# Patient Record
Sex: MALE | Race: WHITE | NOT HISPANIC OR LATINO | Employment: FULL TIME | URBAN - METROPOLITAN AREA
[De-identification: names, ages, dates, MRNs, and addresses within clinical notes are randomized per-mention and may not be internally consistent; named-entity substitution may affect disease eponyms.]

---

## 2023-01-23 PROBLEM — S86.319A TEAR OF PERONEAL TENDON: Status: ACTIVE | Noted: 2023-01-23

## 2023-01-23 PROBLEM — R79.89 LOW TESTOSTERONE: Status: ACTIVE | Noted: 2023-01-23

## 2023-01-23 PROBLEM — S46.911A STRAIN OF RIGHT SHOULDER: Status: ACTIVE | Noted: 2023-01-23

## 2023-01-23 PROBLEM — J45.901 ASTHMA EXACERBATION, MILD (HHS-HCC): Status: ACTIVE | Noted: 2023-01-23

## 2023-01-23 PROBLEM — E29.1 HYPOGONADISM MALE: Status: ACTIVE | Noted: 2023-01-23

## 2023-01-23 PROBLEM — S89.90XA KNEE INJURY: Status: ACTIVE | Noted: 2023-01-23

## 2023-01-23 PROBLEM — R26.9 GAIT ABNORMALITY: Status: ACTIVE | Noted: 2023-01-23

## 2023-01-23 PROBLEM — M25.671 DECREASED RANGE OF MOTION OF RIGHT ANKLE: Status: ACTIVE | Noted: 2023-01-23

## 2023-01-23 PROBLEM — M25.562 KNEE PAIN, BILATERAL: Status: ACTIVE | Noted: 2023-01-23

## 2023-01-23 PROBLEM — L98.9 SKIN LESION: Status: ACTIVE | Noted: 2023-01-23

## 2023-01-23 PROBLEM — M25.561 KNEE PAIN, BILATERAL: Status: ACTIVE | Noted: 2023-01-23

## 2023-01-23 PROBLEM — E66.9 OBESITY: Status: ACTIVE | Noted: 2023-01-23

## 2023-01-23 PROBLEM — Q66.70 PES CAVUS: Status: ACTIVE | Noted: 2023-01-23

## 2023-01-23 PROBLEM — R10.9 ABDOMINAL PAIN: Status: ACTIVE | Noted: 2023-01-23

## 2023-01-23 PROBLEM — I82.409 DVT (DEEP VENOUS THROMBOSIS) (MULTI): Status: ACTIVE | Noted: 2023-01-23

## 2023-01-23 PROBLEM — R30.0 DYSURIA: Status: ACTIVE | Noted: 2023-01-23

## 2023-01-23 PROBLEM — M76.71 PERONEAL TENDINITIS OF RIGHT LOWER EXTREMITY: Status: ACTIVE | Noted: 2023-01-23

## 2023-01-23 PROBLEM — S49.90XA SHOULDER INJURY: Status: ACTIVE | Noted: 2023-01-23

## 2023-01-23 PROBLEM — R68.82 LOW LIBIDO: Status: ACTIVE | Noted: 2023-01-23

## 2023-01-23 PROBLEM — R29.898 ANKLE WEAKNESS: Status: ACTIVE | Noted: 2023-01-23

## 2023-01-23 PROBLEM — G90.521 COMPLEX REGIONAL PAIN SYNDROME TYPE 1 OF RIGHT LOWER EXTREMITY: Status: ACTIVE | Noted: 2023-01-23

## 2023-01-23 PROBLEM — H66.004 RECURRENT ACUTE SUPPURATIVE OTITIS MEDIA OF RIGHT EAR WITHOUT SPONTANEOUS RUPTURE OF TYMPANIC MEMBRANE: Status: ACTIVE | Noted: 2023-01-23

## 2023-01-23 PROBLEM — R53.83 LETHARGY: Status: ACTIVE | Noted: 2023-01-23

## 2023-01-23 PROBLEM — L01.02 PUSTULAR FOLLICULITIS: Status: ACTIVE | Noted: 2023-01-23

## 2023-01-23 PROBLEM — M67.88 ACHILLES TENDINOSIS OF RIGHT LOWER EXTREMITY: Status: ACTIVE | Noted: 2023-01-23

## 2023-01-23 RX ORDER — DEXTROAMPHETAMINE SACCHARATE, AMPHETAMINE ASPARTATE, DEXTROAMPHETAMINE SULFATE AND AMPHETAMINE SULFATE 2.5; 2.5; 2.5; 2.5 MG/1; MG/1; MG/1; MG/1
TABLET ORAL
COMMUNITY

## 2023-01-23 RX ORDER — MELOXICAM 15 MG/1
15 TABLET ORAL DAILY
COMMUNITY
End: 2023-03-08 | Stop reason: SDUPTHER

## 2023-02-09 PROBLEM — E88.810 METABOLIC SYNDROME X: Status: ACTIVE | Noted: 2022-12-14

## 2023-02-09 PROBLEM — G47.00 INSOMNIA: Status: ACTIVE | Noted: 2021-06-24

## 2023-02-09 PROBLEM — F33.0 MILD RECURRENT MAJOR DEPRESSION (CMS-HCC): Status: ACTIVE | Noted: 2021-06-24

## 2023-02-09 PROBLEM — F90.0 ATTENTION DEFICIT HYPERACTIVITY DISORDER, PREDOMINANTLY INATTENTIVE TYPE: Status: ACTIVE | Noted: 2021-10-20

## 2023-02-09 PROBLEM — M25.569 PAIN IN UNSPECIFIED KNEE: Status: ACTIVE | Noted: 2021-10-20

## 2023-02-09 PROBLEM — E29.1 TESTICULAR HYPOFUNCTION: Status: ACTIVE | Noted: 2021-08-26

## 2023-02-09 RX ORDER — LEVOCETIRIZINE DIHYDROCHLORIDE 5 MG/1
1 TABLET, FILM COATED ORAL DAILY
COMMUNITY
Start: 2021-06-24

## 2023-02-09 RX ORDER — AZELASTINE 1 MG/ML
1 SPRAY, METERED NASAL 2 TIMES DAILY
COMMUNITY
Start: 2022-06-21

## 2023-02-09 RX ORDER — DEXTROAMPHETAMINE SACCHARATE, AMPHETAMINE ASPARTATE, DEXTROAMPHETAMINE SULFATE AND AMPHETAMINE SULFATE 5; 5; 5; 5 MG/1; MG/1; MG/1; MG/1
1 TABLET ORAL 2 TIMES DAILY
COMMUNITY
Start: 2022-01-04

## 2023-02-09 RX ORDER — DESLORATADINE 5 MG/1
1 TABLET ORAL DAILY
COMMUNITY

## 2023-02-09 RX ORDER — TESTOSTERONE CYPIONATE 1000 MG/10ML
2 INJECTION, SOLUTION INTRAMUSCULAR
COMMUNITY
Start: 2021-08-27

## 2023-02-09 RX ORDER — ONDANSETRON 4 MG/1
1 TABLET, ORALLY DISINTEGRATING ORAL EVERY 6 HOURS
COMMUNITY
Start: 2022-12-20

## 2023-03-08 ENCOUNTER — OFFICE VISIT (OUTPATIENT)
Dept: PRIMARY CARE | Facility: CLINIC | Age: 43
End: 2023-03-08
Payer: COMMERCIAL

## 2023-03-08 VITALS
SYSTOLIC BLOOD PRESSURE: 124 MMHG | BODY MASS INDEX: 39.96 KG/M2 | HEART RATE: 84 BPM | TEMPERATURE: 98.4 F | OXYGEN SATURATION: 97 % | WEIGHT: 295 LBS | DIASTOLIC BLOOD PRESSURE: 82 MMHG | HEIGHT: 72 IN

## 2023-03-08 DIAGNOSIS — G89.4 CHRONIC PAIN SYNDROME: ICD-10-CM

## 2023-03-08 DIAGNOSIS — R05.1 ACUTE COUGH: ICD-10-CM

## 2023-03-08 DIAGNOSIS — F33.0 MILD RECURRENT MAJOR DEPRESSION (CMS-HCC): ICD-10-CM

## 2023-03-08 DIAGNOSIS — H66.001 ACUTE SUPPURATIVE OTITIS MEDIA OF RIGHT EAR WITHOUT SPONTANEOUS RUPTURE OF TYMPANIC MEMBRANE, RECURRENCE NOT SPECIFIED: ICD-10-CM

## 2023-03-08 DIAGNOSIS — E66.01 CLASS 3 SEVERE OBESITY DUE TO EXCESS CALORIES WITHOUT SERIOUS COMORBIDITY WITH BODY MASS INDEX (BMI) OF 40.0 TO 44.9 IN ADULT (MULTI): ICD-10-CM

## 2023-03-08 DIAGNOSIS — E29.1 TESTICULAR HYPOFUNCTION: Primary | ICD-10-CM

## 2023-03-08 PROBLEM — M25.569 PAIN IN UNSPECIFIED KNEE: Status: RESOLVED | Noted: 2021-10-20 | Resolved: 2023-03-08

## 2023-03-08 PROBLEM — S46.911A STRAIN OF RIGHT SHOULDER: Status: RESOLVED | Noted: 2023-01-23 | Resolved: 2023-03-08

## 2023-03-08 PROBLEM — R79.89 LOW TESTOSTERONE: Status: RESOLVED | Noted: 2023-01-23 | Resolved: 2023-03-08

## 2023-03-08 PROBLEM — R26.9 GAIT ABNORMALITY: Status: RESOLVED | Noted: 2023-01-23 | Resolved: 2023-03-08

## 2023-03-08 PROBLEM — M25.671 DECREASED RANGE OF MOTION OF RIGHT ANKLE: Status: RESOLVED | Noted: 2023-01-23 | Resolved: 2023-03-08

## 2023-03-08 PROBLEM — J45.901 ASTHMA EXACERBATION, MILD (HHS-HCC): Status: RESOLVED | Noted: 2023-01-23 | Resolved: 2023-03-08

## 2023-03-08 PROBLEM — R30.0 DYSURIA: Status: RESOLVED | Noted: 2023-01-23 | Resolved: 2023-03-08

## 2023-03-08 PROBLEM — R10.9 ABDOMINAL PAIN: Status: RESOLVED | Noted: 2023-01-23 | Resolved: 2023-03-08

## 2023-03-08 PROBLEM — M76.71 PERONEAL TENDINITIS OF RIGHT LOWER EXTREMITY: Status: RESOLVED | Noted: 2023-01-23 | Resolved: 2023-03-08

## 2023-03-08 PROBLEM — I82.409 DVT (DEEP VENOUS THROMBOSIS) (MULTI): Status: RESOLVED | Noted: 2023-01-23 | Resolved: 2023-03-08

## 2023-03-08 PROBLEM — R29.898 ANKLE WEAKNESS: Status: RESOLVED | Noted: 2023-01-23 | Resolved: 2023-03-08

## 2023-03-08 PROBLEM — L98.9 SKIN LESION: Status: RESOLVED | Noted: 2023-01-23 | Resolved: 2023-03-08

## 2023-03-08 PROBLEM — S86.319A TEAR OF PERONEAL TENDON: Status: RESOLVED | Noted: 2023-01-23 | Resolved: 2023-03-08

## 2023-03-08 PROBLEM — R68.82 LOW LIBIDO: Status: RESOLVED | Noted: 2023-01-23 | Resolved: 2023-03-08

## 2023-03-08 PROBLEM — L01.02 PUSTULAR FOLLICULITIS: Status: RESOLVED | Noted: 2023-01-23 | Resolved: 2023-03-08

## 2023-03-08 PROBLEM — Z91.030 BEE ALLERGY STATUS: Status: ACTIVE | Noted: 2023-03-08

## 2023-03-08 PROBLEM — S89.90XA KNEE INJURY: Status: RESOLVED | Noted: 2023-01-23 | Resolved: 2023-03-08

## 2023-03-08 PROBLEM — S49.90XA SHOULDER INJURY: Status: RESOLVED | Noted: 2023-01-23 | Resolved: 2023-03-08

## 2023-03-08 PROCEDURE — 1036F TOBACCO NON-USER: CPT | Performed by: FAMILY MEDICINE

## 2023-03-08 PROCEDURE — 99214 OFFICE O/P EST MOD 30 MIN: CPT | Performed by: FAMILY MEDICINE

## 2023-03-08 PROCEDURE — 3008F BODY MASS INDEX DOCD: CPT | Performed by: FAMILY MEDICINE

## 2023-03-08 RX ORDER — NEOMYCIN SULFATE, POLYMYXIN B SULFATE, AND DEXAMETHASONE 3.5; 10000; 1 MG/G; [USP'U]/G; MG/G
OINTMENT OPHTHALMIC
COMMUNITY
Start: 2022-10-24

## 2023-03-08 RX ORDER — CIPROFLOXACIN AND DEXAMETHASONE 3; 1 MG/ML; MG/ML
SUSPENSION/ DROPS AURICULAR (OTIC)
COMMUNITY
Start: 2023-03-06

## 2023-03-08 RX ORDER — DIAPER,BRIEF,ADULT, DISPOSABLE
EACH MISCELLANEOUS
COMMUNITY

## 2023-03-08 RX ORDER — NEOMYCIN SULFATE, POLYMYXIN B SULFATE AND DEXAMETHASONE 3.5; 10000; 1 MG/ML; [USP'U]/ML; MG/ML
SUSPENSION/ DROPS OPHTHALMIC
COMMUNITY
Start: 2022-06-09

## 2023-03-08 RX ORDER — DULOXETIN HYDROCHLORIDE 20 MG/1
CAPSULE, DELAYED RELEASE ORAL
COMMUNITY
Start: 2023-03-06 | End: 2023-03-08 | Stop reason: ALTCHOICE

## 2023-03-08 RX ORDER — BENZONATATE 100 MG/1
100 CAPSULE ORAL 3 TIMES DAILY PRN
Qty: 42 CAPSULE | Refills: 0 | Status: SHIPPED | OUTPATIENT
Start: 2023-03-08 | End: 2023-04-07

## 2023-03-08 RX ORDER — DULOXETIN HYDROCHLORIDE 30 MG/1
30 CAPSULE, DELAYED RELEASE ORAL 2 TIMES DAILY
Qty: 60 CAPSULE | Refills: 5 | Status: SHIPPED | OUTPATIENT
Start: 2023-03-08 | End: 2023-09-04

## 2023-03-08 RX ORDER — AMOXICILLIN AND CLAVULANATE POTASSIUM 875; 125 MG/1; MG/1
875 TABLET, FILM COATED ORAL 2 TIMES DAILY
Qty: 20 TABLET | Refills: 0 | Status: SHIPPED | OUTPATIENT
Start: 2023-03-08 | End: 2023-03-18

## 2023-03-08 RX ORDER — MELOXICAM 15 MG/1
15 TABLET ORAL DAILY
Qty: 90 TABLET | Refills: 1 | Status: SHIPPED | OUTPATIENT
Start: 2023-03-08 | End: 2023-06-06

## 2023-03-08 RX ORDER — ALBUTEROL SULFATE 90 UG/1
AEROSOL, METERED RESPIRATORY (INHALATION)
COMMUNITY

## 2023-03-08 RX ORDER — CYCLOBENZAPRINE HCL 5 MG
5 TABLET ORAL NIGHTLY PRN
COMMUNITY
Start: 2023-02-06

## 2023-03-08 RX ORDER — EPINEPHRINE 0.3 MG/.3ML
INJECTION SUBCUTANEOUS
COMMUNITY
Start: 2023-02-10

## 2023-03-08 RX ORDER — TRIAMCINOLONE ACETONIDE 5 MG/G
CREAM TOPICAL
COMMUNITY
Start: 2023-02-10

## 2023-03-08 ASSESSMENT — ENCOUNTER SYMPTOMS
COUGH: 1
FEVER: 0
CHILLS: 0

## 2023-03-08 ASSESSMENT — PATIENT HEALTH QUESTIONNAIRE - PHQ9
1. LITTLE INTEREST OR PLEASURE IN DOING THINGS: NOT AT ALL
2. FEELING DOWN, DEPRESSED OR HOPELESS: NOT AT ALL
SUM OF ALL RESPONSES TO PHQ9 QUESTIONS 1 AND 2: 0

## 2023-03-08 NOTE — PROGRESS NOTES
ASSESSMENT/PLAN:      Problem List Items Addressed This Visit          Nervous    Chronic pain syndrome    Relevant Medications    DULoxetine (Cymbalta) 30 mg DR capsule    meloxicam (Mobic) 15 mg tablet       Endocrine/Metabolic    Obesity    Relevant Medications    orlistat (Chris) 60 mg capsule    Testicular hypofunction - Primary       Other    Mild recurrent major depression (CMS/HCC)     Other Visit Diagnoses       Acute cough        Relevant Medications    benzonatate (Tessalon) 100 mg capsule    Acute suppurative otitis media of right ear without spontaneous rupture of tympanic membrane, recurrence not specified        Relevant Medications    amoxicillin-pot clavulanate (Augmentin) 875-125 mg tablet    Other Relevant Orders    Referral to ENT            Patient Instructions:  Patient Instructions   For your pain, we increased your duloxetine to 30 mg.  Glad that this is helping you significantly  Obesity: Continue exercising and healthy eating, will try orlistat, hopefully insurance covers it.  Recommend reaching out to your insurance company to figure out what medications they cover for weight loss  Recurrent ear infection: We sent in a prescription for Augmentin, and refer to ENT  Follow-up in 3 months for weight check        Signed by: Meño Diaz DO       FUTURE DIRECTION:   Encourage patient to call insurance company to figure out what meds are covered for obesity  May need to increase duloxetine to 60 mg daily  May need to discontinue orlistat if patient experiencing significant nausea or diarrhea    Subjective   SUBJECTIVE:     HPI : Patient is a 42 y.o. male who presents today for the following     ADHD  No longer taking Adderall due to side effects    Chronic pain  Continue meloxicam 15 mg, follows rheumatology, doing well with duloxetine    Obesity  states that he has been working on weight loss   Has bee seeing a nutritionist, and exercise twice daily (about 300min of cardio weekly)    Currently on 2000 calories daily     Date             Weight (lbs)             Intervention   8/29/22          300lbs                          none  10/28/22        291lbs                     nutritionist and exercising   12/14/22        290lbs                     nutritionist and exercising   2/10/22          295lbs                     nutritionist and exercising  3/8/2022        295 LBS                   nutritionist and exercising           Right ear pain   Patient states that for the past few years has been having recurrent right ear infection, over the past week has noticed increased in bloody drainage.  He normally does not have any pain when he is having an ear infection.  This is also associated with a cough that has been persistent throughout the day, it is nonproductive.  He denies fevers or chills      Review of Systems   Constitutional:  Negative for chills and fever.   HENT:  Positive for ear discharge.    Respiratory:  Positive for cough.        Past Medical History:   Diagnosis Date    DVT (deep venous thrombosis) (CMS/[facility]) 01/23/2023    Knee injury 01/23/2023    Peroneal tendinitis of right lower extremity 01/23/2023    Shoulder injury 01/23/2023    Tear of peroneal tendon 01/23/2023        Past Surgical History:   Procedure Laterality Date    OTHER SURGICAL HISTORY  12/03/2021    Ankle surgery    OTHER SURGICAL HISTORY  12/03/2021    Ear surgery    OTHER SURGICAL HISTORY  12/03/2021    Umbilical hernia repair    OTHER SURGICAL HISTORY  12/03/2021    Inguinal hernia repair        Current Outpatient Medications   Medication Instructions    albuterol 90 mcg/actuation inhaler inhalation    amoxicillin-pot clavulanate (Augmentin) 875-125 mg tablet 875 mg, oral, 2 times daily    amphetamine-dextroamphetamine (Adderall) 10 mg tablet oral    amphetamine-dextroamphetamine (Adderall) 20 mg tablet 1 tablet, oral, 2 times daily, 1/11/23    azelastine (Astelin) 137 mcg (0.1 %) nasal spray 1 spray, Each  Nostril, 2 times daily    benzonatate (TESSALON) 100 mg, oral, 3 times daily PRN, Do not crush or chew.     Ciprodex otic suspension     cyclobenzaprine (FLEXERIL) 5 mg, oral, Nightly PRN    desloratadine (CLARINEX ORAL) oral    desloratadine (Clarinex) 5 mg tablet 1 tablet, oral, Daily    DULoxetine (CYMBALTA) 30 mg, oral, 2 times daily, Do not crush or chew.     EPINEPHrine 0.3 mg/0.3 mL injection syringe ADMINISTER 0.3 MILLILITERS INTRAMUSCULARLY ONE TIME MAY REPEAT ONE TIME FOR ALLERGY    levocetirizine (Xyzal) 5 mg tablet 1 tablet, oral, Daily, Zyzal Allergy 24HR (levocetirizine Dihydrochloride) 5 MG Oral Tablets    lysine 500 mg tablet oral    meloxicam (MOBIC) 15 mg, oral, Daily, With food    neomycin-polymyxin B-dexameth (Polydex) 3.5 mg/g-10,000 unit/g-0.1 % ointment ophthalmic ointment APPLY A SMALL AMOUNT ON EYELID EVERY NIGHT    neomycin-polymyxin-dexAMETHasone (Maxitrol) 3.5mg/mL-10,000 unit/mL-0.1 % ophthalmic suspension INSTILL 1 DROP INTO THE LEFT EYE 4 TIMES DAILY FOR 1 WEEK    NON FORMULARY Xyzal tabs    ondansetron ODT (Zofran-ODT) 4 mg disintegrating tablet 1 tablet, oral, Every 6 hours, On the tongue and allow to dissolve, for nausea and vomiting    orlistat (HILDA) 60 mg, oral, 3 times daily with meals    testosterone cypionate (Depo-Testosterone) 100 mg/mL injection 2 mL, intramuscular, Weekly, Disp w/syringes please    triamcinolone (Kenalog) 0.5 % cream USE DAILY FOR TWO WEEKS AS DIRECTED        Allergies   Allergen Reactions    Cephalosporins Unknown    Keflex [Cephalexin] Unknown    Metronidazole Unknown     Flagyl    Sulfamethoxazole-Trimethoprim Unknown     Bactrim    Adhesive Rash    Clarithromycin Unknown and Rash     Biaxin    Doxycycline Rash and Other     Other=Facial swelling        Social History     Socioeconomic History    Marital status: Single     Spouse name: Not on file    Number of children: Not on file    Years of education: Not on file    Highest education level: Not on  file   Occupational History    Not on file   Tobacco Use    Smoking status: Never    Smokeless tobacco: Former     Types: Chew     Quit date: 2002   Vaping Use    Vaping status: Never Used   Substance and Sexual Activity    Alcohol use: Not on file    Drug use: Not on file    Sexual activity: Not on file   Other Topics Concern    Not on file   Social History Narrative    Not on file     Social Determinants of Health     Financial Resource Strain: Not on file   Food Insecurity: Not on file   Transportation Needs: Not on file   Physical Activity: Not on file   Stress: Not on file   Social Connections: Not on file   Intimate Partner Violence: Not on file   Housing Stability: Not on file        No family history on file.     Objective   OBJECTIVE:     Vitals:    03/08/23 0743   BP: 124/82   Pulse: 84   Temp: 36.9 °C (98.4 °F)   SpO2: 97%   Weight: 134 kg (295 lb)   Height: 1.829 m (6')        Physical Exam  Constitutional:       Appearance: Normal appearance.   HENT:      Head: Normocephalic.      Right Ear: Drainage present.      Ears:     Pulmonary:      Effort: Pulmonary effort is normal.   Musculoskeletal:      Cervical back: Normal range of motion.   Neurological:      Mental Status: He is alert.   Psychiatric:         Mood and Affect: Mood normal.

## 2023-03-08 NOTE — PATIENT INSTRUCTIONS
For your pain, we increased your duloxetine to 30 mg.  Glad that this is helping you significantly  Obesity: Continue exercising and healthy eating, will try orlistat, hopefully insurance covers it.  Recommend reaching out to your insurance company to figure out what medications they cover for weight loss  Recurrent ear infection: We sent in a prescription for Augmentin, and refer to ENT  Follow-up in 3 months for weight check

## 2023-04-14 LAB
ANION GAP IN SER/PLAS: 10 MMOL/L (ref 10–20)
CALCIDIOL (25 OH VITAMIN D3) (NG/ML) IN SER/PLAS: 25 NG/ML
CALCIUM (MG/DL) IN SER/PLAS: 8.9 MG/DL (ref 8.6–10.3)
CARBON DIOXIDE, TOTAL (MMOL/L) IN SER/PLAS: 29 MMOL/L (ref 21–32)
CHLORIDE (MMOL/L) IN SER/PLAS: 104 MMOL/L (ref 98–107)
CORTISOL (UG/DL) IN SERUM - AM: 16 UG/DL (ref 5–20)
CREATININE (MG/DL) IN SER/PLAS: 0.77 MG/DL (ref 0.5–1.3)
DEHYDROEPIANDROSTERONE SULFATE (DHEA-S) (UG/DL) IN SER/: 127 UG/DL (ref 95–530)
ESTIMATED AVERAGE GLUCOSE FOR HBA1C: 123 MG/DL
FOLLITROPIN (IU/L) IN SER/PLAS: 3.3 IU/L
GFR MALE: >90 ML/MIN/1.73M2
GLUCOSE (MG/DL) IN SER/PLAS: 90 MG/DL (ref 74–99)
HEMOGLOBIN A1C/HEMOGLOBIN TOTAL IN BLOOD: 5.9 %
LUTEINIZING HORMONE (IU/ML) IN SER/PLAS: 2.4 IU/L
POTASSIUM (MMOL/L) IN SER/PLAS: 4.3 MMOL/L (ref 3.5–5.3)
PROLACTIN (UG/L) IN SER/PLAS: 12.3 UG/L (ref 2–18)
SODIUM (MMOL/L) IN SER/PLAS: 139 MMOL/L (ref 136–145)
THYROTROPIN (MIU/L) IN SER/PLAS BY DETECTION LIMIT <= 0.05 MIU/L: 1.49 MIU/L (ref 0.44–3.98)
THYROXINE (T4) FREE (NG/DL) IN SER/PLAS: 0.73 NG/DL (ref 0.61–1.12)
UREA NITROGEN (MG/DL) IN SER/PLAS: 23 MG/DL (ref 6–23)

## 2023-04-17 LAB
ADRENOCORTICOTROPIC HORMONE: 25.4 PG/ML (ref 7.2–63.3)
ANTI-NUCLEAR ANTIBODY (ANA): NEGATIVE
IGF 1 (INSULIN-LIKE GROWTH FACTOR 1): 72 NG/ML (ref 82–237)
IGF 1 Z SCORE CALCULATION: -2.9
RHEUMATOID FACTOR (IU/ML) IN SERUM OR PLASMA: <10 IU/ML (ref 0–15)
SEDIMENTATION RATE, ERYTHROCYTE: 4 MM/H (ref 0–15)
URATE (MG/DL) IN SER/PLAS: 5.9 MG/DL (ref 4–7.5)

## 2023-04-21 LAB
TESTOSTERONE FREE (CHAN): 58 PG/ML (ref 35–155)
TESTOSTERONE,TOTAL,LC-MS/MS: 271 NG/DL (ref 250–1100)

## 2023-06-20 ENCOUNTER — APPOINTMENT (OUTPATIENT)
Dept: PRIMARY CARE | Facility: CLINIC | Age: 43
End: 2023-06-20
Payer: COMMERCIAL

## 2023-07-25 RX ORDER — DESLORATADINE 5 MG/1
5 TABLET ORAL DAILY
Qty: 90 TABLET | OUTPATIENT
Start: 2023-07-25

## 2023-07-25 RX ORDER — LEVOCETIRIZINE DIHYDROCHLORIDE 5 MG/1
5 TABLET, FILM COATED ORAL DAILY
Qty: 90 TABLET | OUTPATIENT
Start: 2023-07-25

## 2023-08-31 PROBLEM — R07.89 CHEST PAIN, MUSCULOSKELETAL: Status: ACTIVE | Noted: 2019-07-03

## 2023-08-31 PROBLEM — H90.A31 MIXED CONDUCTIVE AND SENSORINEURAL HEARING LOSS OF RIGHT EAR WITH RESTRICTED HEARING OF LEFT EAR: Status: ACTIVE | Noted: 2018-11-26

## 2023-08-31 PROBLEM — G50.0 TRIGEMINAL NEURALGIA OF RIGHT SIDE OF FACE: Status: ACTIVE | Noted: 2019-03-26

## 2023-08-31 PROBLEM — M25.50 ARTHRALGIA: Status: ACTIVE | Noted: 2023-08-31

## 2023-08-31 PROBLEM — R06.09 DYSPNEA ON MINIMAL EXERTION: Status: ACTIVE | Noted: 2019-07-03

## 2023-08-31 PROBLEM — E66.813 CLASS 3 SEVERE OBESITY WITH BODY MASS INDEX (BMI) OF 40.0 TO 44.9 IN ADULT: Status: ACTIVE | Noted: 2023-08-31

## 2023-08-31 PROBLEM — F41.1 GAD (GENERALIZED ANXIETY DISORDER): Status: ACTIVE | Noted: 2018-08-27

## 2023-08-31 PROBLEM — M76.62 ACHILLES TENDINITIS OF BOTH LOWER EXTREMITIES: Status: ACTIVE | Noted: 2017-05-09

## 2023-08-31 PROBLEM — K42.9 UMBILICAL HERNIA WITHOUT OBSTRUCTION AND WITHOUT GANGRENE: Status: ACTIVE | Noted: 2023-08-31

## 2023-08-31 PROBLEM — G44.229 CHRONIC TENSION HEADACHE: Status: ACTIVE | Noted: 2019-03-26

## 2023-08-31 PROBLEM — G43.009 MIGRAINE WITHOUT AURA AND WITHOUT STATUS MIGRAINOSUS, NOT INTRACTABLE: Status: ACTIVE | Noted: 2019-07-19

## 2023-08-31 PROBLEM — E11.9 CONTROLLED TYPE 2 DIABETES MELLITUS WITHOUT COMPLICATION, WITHOUT LONG-TERM CURRENT USE OF INSULIN (MULTI): Status: ACTIVE | Noted: 2023-07-22

## 2023-08-31 PROBLEM — M76.61 ACHILLES TENDINITIS OF BOTH LOWER EXTREMITIES: Status: ACTIVE | Noted: 2017-05-09

## 2023-08-31 PROBLEM — G50.1 ATYPICAL FACE PAIN: Status: ACTIVE | Noted: 2019-07-19

## 2023-08-31 PROBLEM — E66.01 CLASS 3 SEVERE OBESITY WITH BODY MASS INDEX (BMI) OF 40.0 TO 44.9 IN ADULT (MULTI): Status: ACTIVE | Noted: 2023-08-31

## 2023-08-31 RX ORDER — PANTOPRAZOLE SODIUM 40 MG/1
1 TABLET, DELAYED RELEASE ORAL DAILY
COMMUNITY
Start: 2023-06-28

## 2023-08-31 RX ORDER — BENZONATATE 100 MG/1
100 CAPSULE ORAL 3 TIMES DAILY PRN
COMMUNITY
Start: 2023-05-24

## 2023-08-31 RX ORDER — METHOCARBAMOL 500 MG/1
1 TABLET, FILM COATED ORAL 3 TIMES DAILY
COMMUNITY
Start: 2023-08-30

## 2023-08-31 RX ORDER — CARBAMAZEPINE 200 MG/1
200 TABLET ORAL 2 TIMES DAILY
COMMUNITY
Start: 2023-07-23

## 2023-08-31 RX ORDER — METFORMIN HYDROCHLORIDE 500 MG/1
1 TABLET ORAL DAILY
COMMUNITY
Start: 2023-05-25

## 2023-08-31 RX ORDER — PREDNISONE 20 MG/1
TABLET ORAL
COMMUNITY
Start: 2022-09-29

## 2023-08-31 RX ORDER — AMITRIPTYLINE HYDROCHLORIDE 25 MG/1
1 TABLET, FILM COATED ORAL NIGHTLY
COMMUNITY
Start: 2023-08-02

## 2023-08-31 RX ORDER — CHOLECALCIFEROL (VITAMIN D3) 50 MCG
50 TABLET ORAL DAILY
COMMUNITY
Start: 2023-07-22

## 2023-08-31 RX ORDER — FLUTICASONE PROPIONATE 50 MCG
1 SPRAY, SUSPENSION (ML) NASAL NIGHTLY
COMMUNITY
Start: 2020-11-06

## 2023-08-31 RX ORDER — MELOXICAM 7.5 MG/1
1 TABLET ORAL 2 TIMES DAILY PRN
COMMUNITY
Start: 2022-10-11

## 2023-09-07 ENCOUNTER — OFFICE VISIT (OUTPATIENT)
Dept: PRIMARY CARE | Facility: CLINIC | Age: 43
End: 2023-09-07
Payer: COMMERCIAL

## 2023-09-07 VITALS
SYSTOLIC BLOOD PRESSURE: 136 MMHG | DIASTOLIC BLOOD PRESSURE: 80 MMHG | TEMPERATURE: 97.8 F | OXYGEN SATURATION: 97 % | HEART RATE: 91 BPM

## 2023-09-07 DIAGNOSIS — H92.01 RIGHT EAR PAIN: Primary | ICD-10-CM

## 2023-09-07 DIAGNOSIS — R05.1 ACUTE COUGH: ICD-10-CM

## 2023-09-07 DIAGNOSIS — H66.91 OM (OTITIS MEDIA), RECURRENT, RIGHT: ICD-10-CM

## 2023-09-07 PROCEDURE — 3008F BODY MASS INDEX DOCD: CPT | Performed by: FAMILY MEDICINE

## 2023-09-07 PROCEDURE — 99213 OFFICE O/P EST LOW 20 MIN: CPT | Performed by: FAMILY MEDICINE

## 2023-09-07 PROCEDURE — 3079F DIAST BP 80-89 MM HG: CPT | Performed by: FAMILY MEDICINE

## 2023-09-07 PROCEDURE — 3044F HG A1C LEVEL LT 7.0%: CPT | Performed by: FAMILY MEDICINE

## 2023-09-07 PROCEDURE — 3075F SYST BP GE 130 - 139MM HG: CPT | Performed by: FAMILY MEDICINE

## 2023-09-07 PROCEDURE — 1036F TOBACCO NON-USER: CPT | Performed by: FAMILY MEDICINE

## 2023-09-07 PROCEDURE — 87636 SARSCOV2 & INF A&B AMP PRB: CPT

## 2023-09-07 RX ORDER — AZITHROMYCIN 250 MG/1
TABLET, FILM COATED ORAL
Qty: 6 TABLET | Refills: 0 | Status: SHIPPED | OUTPATIENT
Start: 2023-09-07 | End: 2023-09-12

## 2023-09-07 ASSESSMENT — ENCOUNTER SYMPTOMS
FEVER: 1
CHILLS: 1
COUGH: 1
FATIGUE: 1

## 2023-09-07 NOTE — PROGRESS NOTES
Assessment     ASSESSMENT/PLAN:      Problem List Items Addressed This Visit    None  Visit Diagnoses       Right ear pain    -  Primary    Acute cough        Relevant Orders    Sars-CoV-2 PCR, Symptomatic    Influenza A, and B PCR    OM (otitis media), recurrent, right        Relevant Medications    azithromycin (Zithromax) 250 mg tablet            Patient Instructions:  Patient Instructions   Right ear OM: Patient with multidrug allergy, will try azithromycin since this was tolerated in the past      Signed by: Meño Diaz DO       FUTURE DIRECTION:   [    Subjective   SUBJECTIVE:     HPI : Patient is a 43 y.o. male who presents today for the following:     Earache  -Ongoing for the past 5 days  -Associated with headache, cough, congestion, fever, chills  -No improvement with NSAIDs or Tylenol  -Denies recent contact    Review of Systems   Constitutional:  Positive for chills, fatigue and fever.   HENT:  Positive for ear discharge and ear pain.    Respiratory:  Positive for cough.        Past Medical History:   Diagnosis Date    DVT (deep venous thrombosis) (CMS/[facility]) 01/23/2023    Knee injury 01/23/2023    Peroneal tendinitis of right lower extremity 01/23/2023    Shoulder injury 01/23/2023    Tear of peroneal tendon 01/23/2023        Past Surgical History:   Procedure Laterality Date    OTHER SURGICAL HISTORY  12/03/2021    Ankle surgery    OTHER SURGICAL HISTORY  12/03/2021    Ear surgery    OTHER SURGICAL HISTORY  12/03/2021    Umbilical hernia repair    OTHER SURGICAL HISTORY  12/03/2021    Inguinal hernia repair        Current Outpatient Medications   Medication Instructions    albuterol 90 mcg/actuation inhaler inhalation    amitriptyline (Elavil) 25 mg tablet 1 tablet, oral, Nightly    amphetamine-dextroamphetamine (Adderall) 10 mg tablet oral    amphetamine-dextroamphetamine (Adderall) 20 mg tablet 1 tablet, oral, 2 times daily, 1/11/23    azelastine (Astelin) 137 mcg (0.1 %) nasal spray 1  spray, Each Nostril, 2 times daily    azithromycin (Zithromax) 250 mg tablet Take 2 tablets (500 mg) by mouth once daily for 1 day, THEN 1 tablet (250 mg) once daily for 4 days. Take 2 tabs (500 mg) by mouth today, than 1 daily for 4 days..    benzonatate (TESSALON) 100 mg, oral, 3 times daily PRN, Do no crush or chew    carBAMazepine (TEGRETOL) 200 mg, oral, 2 times daily    cholecalciferol (VITAMIN D-3) 50 mcg, oral, Daily    Ciprodex otic suspension     cyclobenzaprine (FLEXERIL) 5 mg, oral, Nightly PRN    desloratadine (CLARINEX ORAL) oral    desloratadine (Clarinex) 5 mg tablet 1 tablet, oral, Daily    DULoxetine (CYMBALTA) 30 mg, oral, 2 times daily, Do not crush or chew.     EPINEPHrine 0.3 mg/0.3 mL injection syringe ADMINISTER 0.3 MILLILITERS INTRAMUSCULARLY ONE TIME MAY REPEAT ONE TIME FOR ALLERGY    fluticasone (Flonase) 50 mcg/actuation nasal spray 1 spray, Each Nostril, Nightly    levocetirizine (Xyzal) 5 mg tablet 1 tablet, oral, Daily, Zyzal Allergy 24HR (levocetirizine Dihydrochloride) 5 MG Oral Tablets    lysine 500 mg tablet oral    meloxicam (Mobic) 7.5 mg tablet 1 tablet, oral, 2 times daily PRN, WITH FOOD    metFORMIN (Glucophage) 500 mg tablet 1 tablet, oral, Daily, With the largest meal     methocarbamol (Robaxin) 500 mg tablet 1 tablet, oral, 3 times daily    neomycin-polymyxin B-dexameth (Polydex) 3.5 mg/g-10,000 unit/g-0.1 % ointment ophthalmic ointment APPLY A SMALL AMOUNT ON EYELID EVERY NIGHT    neomycin-polymyxin-dexAMETHasone (Maxitrol) 3.5mg/mL-10,000 unit/mL-0.1 % ophthalmic suspension INSTILL 1 DROP INTO THE LEFT EYE 4 TIMES DAILY FOR 1 WEEK    NON FORMULARY Xyzal tabs    ondansetron ODT (Zofran-ODT) 4 mg disintegrating tablet 1 tablet, oral, Every 6 hours, On the tongue and allow to dissolve, for nausea and vomiting    orlistat (HILDA) 60 mg, oral, 3 times daily with meals    pantoprazole (ProtoNix) 40 mg EC tablet 1 tablet, oral, Daily    predniSONE (Deltasone) 20 mg tablet      testosterone cypionate (Depo-Testosterone) 100 mg/mL injection 2 mL, intramuscular, Weekly, Disp w/syringes please    triamcinolone (Kenalog) 0.5 % cream USE DAILY FOR TWO WEEKS AS DIRECTED        Allergies   Allergen Reactions    Cephalosporins Unknown    Keflex [Cephalexin] Unknown    Metronidazole Unknown     Flagyl    Sulfamethoxazole-Trimethoprim Unknown     Bactrim    Adhesive Rash    Clarithromycin Unknown and Rash     Biaxin    Clindamycin Diarrhea, Itching and Other    Doxycycline Rash and Other     Other=Facial swelling        Social History     Socioeconomic History    Marital status: Single     Spouse name: Not on file    Number of children: Not on file    Years of education: Not on file    Highest education level: Not on file   Occupational History    Not on file   Tobacco Use    Smoking status: Never    Smokeless tobacco: Former     Types: Chew     Quit date: 2002   Vaping Use    Vaping Use: Never used   Substance and Sexual Activity    Alcohol use: Not on file    Drug use: Not on file    Sexual activity: Not on file   Other Topics Concern    Not on file   Social History Narrative    Not on file     Social Determinants of Health     Financial Resource Strain: Not on file   Food Insecurity: Not on file   Transportation Needs: Not on file   Physical Activity: Not on file   Stress: Not on file   Social Connections: Not on file   Intimate Partner Violence: Not on file   Housing Stability: Not on file        Family History   Problem Relation Name Age of Onset    No Known Problems Mother      No Known Problems Father          Objective     OBJECTIVE:     Vitals:    09/07/23 1130   BP: 136/80   Pulse: 91   Temp: 36.6 °C (97.8 °F)   SpO2: 97%        Physical Exam  Constitutional:       Appearance: Normal appearance.   HENT:      Head: Normocephalic.      Right Ear: Drainage present. Tympanic membrane is erythematous.   Pulmonary:      Effort: Pulmonary effort is normal.   Musculoskeletal:      Cervical back:  Normal range of motion.   Neurological:      Mental Status: He is alert.   Psychiatric:         Mood and Affect: Mood normal.

## 2023-09-07 NOTE — PATIENT INSTRUCTIONS
Right ear OM: Patient with multidrug allergy, will try azithromycin since this was tolerated in the past.  We will send nasal swab for COVID and flu

## 2023-09-08 LAB
FLU A RESULT: NOT DETECTED
FLU B RESULT: NOT DETECTED
SARS-COV-2 RESULT: NOT DETECTED

## 2023-10-09 ENCOUNTER — APPOINTMENT (OUTPATIENT)
Dept: ENDOCRINOLOGY | Facility: CLINIC | Age: 43
End: 2023-10-09
Payer: COMMERCIAL

## 2024-07-08 ENCOUNTER — OFFICE VISIT (OUTPATIENT)
Dept: PRIMARY CARE | Facility: CLINIC | Age: 44
End: 2024-07-08
Payer: COMMERCIAL

## 2024-07-08 VITALS
DIASTOLIC BLOOD PRESSURE: 90 MMHG | OXYGEN SATURATION: 98 % | SYSTOLIC BLOOD PRESSURE: 144 MMHG | HEART RATE: 110 BPM | TEMPERATURE: 97.9 F

## 2024-07-08 DIAGNOSIS — J01.90 ACUTE SINUSITIS, RECURRENCE NOT SPECIFIED, UNSPECIFIED LOCATION: Primary | ICD-10-CM

## 2024-07-08 PROBLEM — I10 PRIMARY HYPERTENSION: Status: ACTIVE | Noted: 2024-01-26

## 2024-07-08 PROCEDURE — 1036F TOBACCO NON-USER: CPT | Performed by: PHYSICIAN ASSISTANT

## 2024-07-08 PROCEDURE — 3077F SYST BP >= 140 MM HG: CPT | Performed by: PHYSICIAN ASSISTANT

## 2024-07-08 PROCEDURE — 99214 OFFICE O/P EST MOD 30 MIN: CPT | Performed by: PHYSICIAN ASSISTANT

## 2024-07-08 PROCEDURE — 3080F DIAST BP >= 90 MM HG: CPT | Performed by: PHYSICIAN ASSISTANT

## 2024-07-08 RX ORDER — AMLODIPINE BESYLATE 10 MG/1
1 TABLET ORAL
COMMUNITY
Start: 2024-02-23

## 2024-07-08 RX ORDER — VALACYCLOVIR HYDROCHLORIDE 1 G/1
1 TABLET, FILM COATED ORAL
COMMUNITY
Start: 2024-04-17 | End: 2024-07-08 | Stop reason: ALTCHOICE

## 2024-07-08 RX ORDER — PHENTERMINE HYDROCHLORIDE 37.5 MG/1
1 TABLET ORAL
COMMUNITY
Start: 2024-04-17

## 2024-07-08 RX ORDER — AZITHROMYCIN 250 MG/1
TABLET, FILM COATED ORAL DAILY
Qty: 6 TABLET | Refills: 0 | Status: SHIPPED | OUTPATIENT
Start: 2024-07-08 | End: 2024-07-13

## 2024-07-08 ASSESSMENT — ENCOUNTER SYMPTOMS
SHORTNESS OF BREATH: 0
CHILLS: 0
FEVER: 0

## 2024-07-08 NOTE — PROGRESS NOTES
Subjective   Patient ID: Broderick Whitten is a 44 y.o. male who presents for Sinus Problem (Pressure, Drainage, HA x 3 weeks   Pb been seen at [facility] a couple times in 3 weeks).    HPI   Patient c/o cough, nasal discharge or sinus pain. Denies fever, chills, aches, shortness of breath and sore throat.  Present for 3 weeks. Has worsened since onset.   Cough: Cough is mildly productive of greenish sputum.   Nasal discharge: Described as purulent.   Sinus pain: Experiencing facial pain, with dental pain.   Denies associated diarrhea, earache and vomiting.     Taking OTC Mucinex and xyzal.  Also using Flonase and Astelin.     Patient denies recent known COVID exposure or international travel.     COVID negative at [facility] twice the past few weeks.   Allergies   Allergen Reactions    Cephalosporins Unknown    Keflex [Cephalexin] Unknown    Metronidazole Unknown     Flagyl    Sulfamethoxazole-Trimethoprim Unknown     Bactrim    Adhesive Rash    Clarithromycin Unknown and Rash     Biaxin    Clindamycin Diarrhea, Itching and Other    Doxycycline Rash and Other     Other=Facial swelling    Tolerates zithromax well.      reports that he has never smoked. He quit smokeless tobacco use about 22 years ago.  His smokeless tobacco use included chew.    Review of Systems   Constitutional:  Negative for chills and fever.   Respiratory:  Negative for shortness of breath.        Objective   /90   Pulse 110   Temp 36.6 °C (97.9 °F)   SpO2 98%     Physical Exam  Vitals and nursing note reviewed.   Constitutional:       Appearance: Normal appearance.   HENT:      Head: Normocephalic.      Right Ear: Tympanic membrane and ear canal normal.      Left Ear: Tympanic membrane and ear canal normal.      Nose: Congestion present.      Right Sinus: Maxillary sinus tenderness present. No frontal sinus tenderness.      Left Sinus: Maxillary sinus tenderness present. No frontal sinus tenderness.      Mouth/Throat:      Mouth:  Mucous membranes are moist.      Pharynx: No oropharyngeal exudate or posterior oropharyngeal erythema.   Eyes:      General: No scleral icterus.  Cardiovascular:      Rate and Rhythm: Normal rate and regular rhythm.   Pulmonary:      Effort: Pulmonary effort is normal.      Breath sounds: Normal breath sounds.   Lymphadenopathy:      Cervical: No cervical adenopathy.   Skin:     General: Skin is warm and dry.   Neurological:      Mental Status: He is alert.         Assessment/Plan   Diagnoses and all orders for this visit:  Acute sinusitis, recurrence not specified, unspecified location  -     azithromycin (Zithromax) 250 mg tablet; Take 2 tablets (500 mg) by mouth once daily for 1 day, THEN 1 tablet (250 mg) once daily for 4 days. Take 2 tabs (500 mg) by mouth today, than 1 daily for 4 days..       Rx Zithromax (patient states he tolerates med well).   Can use Mucinex DM.  Continue nasal sprays.  Encourage fluids and rest.   Follow up if symptoms increase or persist.

## 2024-12-30 ENCOUNTER — APPOINTMENT (OUTPATIENT)
Dept: UROLOGY | Facility: CLINIC | Age: 44
End: 2024-12-30
Payer: COMMERCIAL

## 2024-12-30 VITALS
WEIGHT: 278 LBS | HEART RATE: 84 BPM | BODY MASS INDEX: 37.65 KG/M2 | DIASTOLIC BLOOD PRESSURE: 96 MMHG | SYSTOLIC BLOOD PRESSURE: 145 MMHG | HEIGHT: 72 IN

## 2024-12-30 DIAGNOSIS — R39.9 URINARY SYMPTOM OR SIGN: ICD-10-CM

## 2024-12-30 DIAGNOSIS — N48.6 PEYRONIE'S DISEASE: Primary | ICD-10-CM

## 2024-12-30 LAB
POC APPEARANCE, URINE: CLEAR
POC BILIRUBIN, URINE: NEGATIVE
POC BLOOD, URINE: ABNORMAL
POC COLOR, URINE: YELLOW
POC GLUCOSE, URINE: NEGATIVE MG/DL
POC KETONES, URINE: ABNORMAL MG/DL
POC LEUKOCYTES, URINE: NEGATIVE
POC NITRITE,URINE: NEGATIVE
POC PH, URINE: 6.5 PH
POC PROTEIN, URINE: NEGATIVE MG/DL
POC SPECIFIC GRAVITY, URINE: 1.02
POC UROBILINOGEN, URINE: 0.2 EU/DL

## 2024-12-30 PROCEDURE — 3080F DIAST BP >= 90 MM HG: CPT | Performed by: UROLOGY

## 2024-12-30 PROCEDURE — 81003 URINALYSIS AUTO W/O SCOPE: CPT | Performed by: UROLOGY

## 2024-12-30 PROCEDURE — 99214 OFFICE O/P EST MOD 30 MIN: CPT | Performed by: UROLOGY

## 2024-12-30 PROCEDURE — 3077F SYST BP >= 140 MM HG: CPT | Performed by: UROLOGY

## 2024-12-30 PROCEDURE — 3008F BODY MASS INDEX DOCD: CPT | Performed by: UROLOGY

## 2024-12-30 RX ORDER — VITAMIN E MIXED 400 UNIT
1000 CAPSULE ORAL DAILY
Qty: 30 CAPSULE | Refills: 1 | Status: SHIPPED | OUTPATIENT
Start: 2024-12-30

## 2024-12-30 NOTE — PROGRESS NOTES
12/30/2024  Painful lump at the head of the penis for 6-month, worse during full erection    Exam: Circumcised,  1 x 2 cm scar tissue at head of the penis    We discussed Peyronie's disease, vitamin E  We discussed specialist consultation  All the questions were answered, the patient expressed understanding and agreed to the plan.    Impression  Peyronie's disease  Painful erection    Plan  Vitamin E 1000 u daily refill x 1  Consult Dr Toscano for Peyronie's disease      Chief Complaint   Patient presents with    Penis Injury     Larger painful lump at the head of his penis Pt states burning and the Pt states it has tonia there for 6 months and very painful        Physical Exam     TODAYS LAB RESULTS:    ontains abnormal data POCT UA Automated manually resulted  Order: [id number]   Status: Final result       Visible to patient: Yes (seen)       Next appt: None       Dx: Urinary symptom or sign    0 Result Notes      Component  Ref Range & Units 09:49   POC Color, Urine  Straw, Yellow, Light-Yellow Yellow   POC Appearance, Urine  Clear Clear   POC Glucose, Urine  NEGATIVE mg/dl NEGATIVE   POC Bilirubin, Urine  NEGATIVE NEGATIVE   POC Ketones, Urine  NEGATIVE mg/dl 15 (1+) Abnormal    POC Specific Gravity, Urine  1.005 - 1.035 1.025   POC Blood, Urine  NEGATIVE SMALL (1+) Abnormal    POC PH, Urine  No Reference Range Established PH 6.5   POC Protein, Urine  NEGATIVE mg/dl NEGATIVE   POC Urobilinogen, Urine  0.2, 1.0 EU/DL 0.2   Poc Nitrite, Urine  NEGATIVE NEGATIVE   POC Leukocytes, Urine  NEGATIVE NEGATIVE        ASSESSMENT&PLAN:      IMPRESSIONS:      Contains abnormal data TESTOSTERONE, TOTAL  Order: [id number]  Component  Ref Range & Units 1 mo ago   Testosterone  193 - 824 ng/dL 178 Low    Comment: A testosterone level in the 193-320 ng/dL range with associated clinical symptoms is considered low and may indicate hypogonadism (from NEJM 2010 363:123-135). Results >320 ng/dL are considered normal.   Resulting  [facility]     Specimen Collected: 11/27/24 07:13   12/22/2022  Patient here today f/u blood work. Last visit he complains of low energy, lack of libido and lethargy. Patient states today that he is still having these same SX.     He was on Testosterone Cyp 6/2021-08/2022.     Last Testosterone 12/12/2022 Total 277, Free 53.5.  Mary Lou Waldrop LPN     We discussed the testosterone results within normal range, there is no urological indication for testosterone replacement,  We discussed endocrinology referral  All the questions were answered, the patient expressed understanding and agreed to the plan.     Impression  Hypogonadism  Low libido  Lethargic     Plan  Referral to endocrinology  Follow-up as needed        12/01/2022  43-year-old gentleman presents lack of libido, energy, lethargic etc. for couple years. Patient has been off testosterone replacement since August.     Patient has no nausea, no vomiting, no fever.     Exam: Circumcised, normal penis, normal testes bilaterally no nodules     New patient here today for hypogonadism.  Referral from Dr. Diaz.     6/21/22 Testosterone 188 ng.  2/22/22 Testosterone 240 ng.  1/4/2022 testosterone 311  10/20/2021 testosterone 504  8/26/2021 testosterone 273  Started 6/2021 - 8/22 on Testosterone Cypionate 250 mg q weekly -     No urine sample left today in office     Iris Odom CMA     We discussed hypogonadism, repeat testosterone total and free  We discussed the testosterone replacement  All the questions were answered, the patient expressed understanding and agreed to the plan.     Impression  Hypogonadism  Low libido  Lethargic     Plan  Reassurance  Testosterone total and free  Appointment in 2 to 3 weeks

## 2024-12-30 NOTE — PATIENT INSTRUCTIONS
Impression  Peyronie's disease  Painful erection    Plan  Vitamin E 1000 u daily refill x 1  Consult Dr Toscano for Peyronie's disease

## 2025-05-06 ENCOUNTER — APPOINTMENT (OUTPATIENT)
Dept: ENDOCRINOLOGY | Facility: CLINIC | Age: 45
End: 2025-05-06
Payer: COMMERCIAL

## 2025-06-13 ENCOUNTER — TELEMEDICINE (OUTPATIENT)
Dept: NEUROLOGY | Facility: HOSPITAL | Age: 45
End: 2025-06-13
Payer: COMMERCIAL

## 2025-06-13 DIAGNOSIS — M62.838 MUSCLE SPASMS OF BOTH LOWER EXTREMITIES: ICD-10-CM

## 2025-06-13 DIAGNOSIS — G47.33 OSA ON CPAP: ICD-10-CM

## 2025-06-13 DIAGNOSIS — G43.109 MIGRAINE WITH AURA AND WITHOUT STATUS MIGRAINOSUS, NOT INTRACTABLE: Primary | ICD-10-CM

## 2025-06-13 PROCEDURE — 99204 OFFICE O/P NEW MOD 45 MIN: CPT | Performed by: PSYCHIATRY & NEUROLOGY

## 2025-06-13 PROCEDURE — 1036F TOBACCO NON-USER: CPT | Performed by: PSYCHIATRY & NEUROLOGY

## 2025-06-13 RX ORDER — SUMATRIPTAN SUCCINATE 100 MG/1
100 TABLET ORAL ONCE AS NEEDED
Qty: 9 TABLET | Refills: 1 | Status: SHIPPED | OUTPATIENT
Start: 2025-06-13 | End: 2025-08-12

## 2025-06-13 RX ORDER — CYCLOBENZAPRINE HCL 5 MG
10 TABLET ORAL 3 TIMES DAILY PRN
Qty: 30 TABLET | Refills: 0 | Status: SHIPPED | OUTPATIENT
Start: 2025-06-13 | End: 2025-06-23

## 2025-06-13 RX ORDER — AMITRIPTYLINE HYDROCHLORIDE 25 MG/1
25 TABLET, FILM COATED ORAL NIGHTLY
Qty: 30 TABLET | Refills: 1 | Status: SHIPPED | OUTPATIENT
Start: 2025-06-13 | End: 2025-08-12

## 2025-06-13 ASSESSMENT — ENCOUNTER SYMPTOMS
OCCASIONAL FEELINGS OF UNSTEADINESS: 1
LOSS OF SENSATION IN FEET: 1
DEPRESSION: 0

## 2025-06-13 ASSESSMENT — PAIN SCALES - GENERAL: PAINLEVEL_OUTOF10: 7

## 2025-06-13 NOTE — LETTER
Vesna 15, 2025     Rajendra Wilkerson MD  [address] [facility]  [facility] [address]    Patient: Broderick Whitten   YOB: 1980   Date of Visit: 6/13/2025       Dear Dr. Rajendra Wilkerson MD:    Thank you for referring Broderick Whitten to me for evaluation. Below are my notes for this consultation.  If you have questions, please do not hesitate to call me. I look forward to following your patient along with you.       Sincerely,     Eliseo Jacobo MD      CC: No Recipients  ______________________________________________________________________________________    Telehealth visit    Visit type: new patient visit    PCP: Rajendra Wilkerson MD.    Subjective    Broderick Whitten is a 44 y.o. year old right handed male who presents with Migraine, Loss of Consciousness, and leg spasms.    Patient is accompanied by none.     Telehealth visit today. The patient was informed about the telehealth clinical encounter including benefits to avoiding travel, limitations of the assessment, and billing for the service. In-office care may be recommended if needed. Telehealth sessions are not being recorded and personal health information is protected. All questions were answered and verbal consent from the patient (or guardian) was obtained to proceed. Patient verbally confirmed, patient physically in [place].     HPI    Here with multiple issues.    Hx anxiety, asthma, diabetes, obesity    Says has spasms in legs. Gets so bad. Lasts few hours, has passed out in past a few times from pain due to spasms.     Dx KWASI 2-3 weeks ago, just started on CPAP machine.    Legs: Dealing with them for about 2 years now. Had surgery in L ankle 2017, later on R ankle. Peroneal tendon. Started Nov 2022, having severe spasms, L leg. Starts at top of foot, leg, never on same spot. Can happen anytime, any position. No trigger. When sleeping, either top of foot or back of calf. Shins and sides of legs. No  "problem in bottom of feet. Has lost weight. Has more pain after he goes to gym. Has had blood work done. MRI l-spine wo done. No problem when on vacation in [place]. Knees hurt as well. Has been evaluated by orthopedics, was told nothing wrong in knee. Has low back pain. Denies sciatica. Feels muscle cramping. Legs feel tight. No muscle rippling. Leg has bruised from spasm. No neurology appointment. [facility] did EMG/NCT, but was told \"nothing was normal\". I do not have records. Tried medical marijuana. Eating potassium, banana. Tonic water, coconut water. Bed raised. Nothing helping. Questioning if somehow this is related to barometric pressure. When on vacation locally in [place], has some as well.    No balance issues.    Has tried to jump out of bed due to spasm in leg. Fell hit side, \"busted head\" on side. ? Orthostatic symptom. 3x in the last year. Trying to stretch out, states \"blacked out\".    Tried PT for knees and legs. Still does exercises. Was on flexeril a long time ago 16 years ago--didn't have pain then. Has not tried muscle relaxers recently.    7/2023   10/2024 Mg normal    Headaches: All day, most of the time. x7 months. Has good days with no HA. ? Related to KWASI. Bifrontal, really bad. Sometimes goes all the way around. If with severe HA vision goes blurry. Ibuprofen/tylenol doesn't help. Throbs. (+) light sensitivity. Sees shooting stars at times. (+) nausea. (-) vomiting. Sometimes 2-3 days in a row, then good for a week. Hasn't had one in 4 days now. Has HA about 12 days a month. Laying down resting helps. No triggers. Worse in the morning. Has woken up from sleep due to HA.    Has had routine eye checks, wears contact/glasses, states no problem in eye contributing to HA.    No prior brain imaging.    No MVA. No kidney stones. (+) asthma. No glaucoma. No known heart issues.    Works as  for insurance company, mostly desk job.     Former smoker. Occasional alcohol. Has medical " marijuana card. No street drug use.      Review of Systems   Constitutional:  Negative for appetite change, chills and fever.   HENT:  Negative for ear pain and nosebleeds.    Eyes:  Negative for discharge and itching.   Respiratory:  Negative for choking and chest tightness.    Cardiovascular:  Negative for chest pain and palpitations.   Gastrointestinal:  Negative for abdominal distention, abdominal pain and nausea.   Endocrine: Negative for cold intolerance and heat intolerance.   Genitourinary:  Negative for difficulty urinating and dysuria.   Musculoskeletal:  Negative for gait problem and myalgias.   Neurological:  Negative for dizziness, seizures and numbness.     Problem List[1]    Medical History[2]  Surgical History[3]  Social History     Tobacco Use   • Smoking status: Former     Current packs/day: 0.50     Average packs/day: 0.5 packs/day for 5.0 years (2.5 ttl pk-yrs)     Types: Cigars, Cigarettes   • Smokeless tobacco: Former     Types: Chew     Quit date: 1/1/2002   Substance Use Topics   • Alcohol use: Not Currently     family history includes No Known Problems in his father and mother.    Allergies[4]  Current Medications[5]    Objective    Vital Signs:  None--telehealth visit    Awake, alert, oriented x3, in no distress  Well-nourished, seated    Mental status exam as above, conversant   Fair fund of knowledge  Recent/remote memory fair  Fair attention span  Full EOMs intact, no nystagmus, no ptosis   No facial droop   Hearing grossly intact   No dysarthria    Motor strength is at least antigravity on all extremities   Finger to nose test intact bilaterally   Negative Romberg sign   I did not have him walk      Lab Results   Component Value Date    WBC 8.5 01/04/2022    RBC 5.71 01/04/2022    HGB 16.5 01/04/2022    HCT 51.5 01/04/2022     01/04/2022     04/14/2023    K 4.3 04/14/2023     04/14/2023    BUN 23 04/14/2023    CREATININE 0.77 04/14/2023    CALCIUM 8.9 04/14/2023     ALKPHOS 68 02/22/2022    AST 28 02/22/2022    ALT 49 02/22/2022    VITD25 25 (A) 04/14/2023    HGBA1C 5.7 (H) 06/09/2025    CHOL 147 07/07/2021    HDL 54.6 07/07/2021    TRIG 74 07/07/2021    TSH 1.49 04/14/2023        MR lumbar spine wo IV contrast 08/29/2023    Narrative  * * *Final Report* * *    DATE OF EXAM: Aug 29 2023  6:24PM    BC   0303  -  MRI LUMBAR SPINE WO IVCON  / ACCESSION #  [id number]    PROCEDURE REASON: Spinal stenosis of lumbar region with neurogenic  claudication    * * * * Physician Interpretation * * * *    COMPARISON: 08/18/2016.    HISTORY:  Lumbar spinal stenosis with neurogenic claudication.    TECHNIQUE: MRI of lumbar spine without contrast.  MQ: [id number]    RESULT: MRI OF LUMBAR SPINE: Acute abnormality: None.    Stable but mild exaggeration of normal lumbar lordosis.  Decreased disc  signal indicating disc desiccation with annular tears at L3-4 and L4-5  and L5-S1 indicating mild disc degeneration but without any canal  compromise.  Mild facet and ligamentum flavum hypertrophy is also  identified.  Mild disc degeneration without canal or thecal sac disease  is identified at T11-12 which is incompletely evaluated on current exam.  Stable alignment, vertebral height, marrow signal, intervertebral disc  height/signal, central canal, thecal sac, spinal cord signal/caliber &  cauda equina.  No fracture/dislocation.  Normal soft tissues.    L1 -- 2: Patent canal and foramina.    L2 -- 3: Patent canal and foramina.    L3 -- 4: Mild disc bulge and joint degeneration.  Patent canal and  foramina.    L4 -- 5: Mild disc bulge and joint degeneration.  Patent canal and  foramina.    L5 -- S1: Disc bulge and joint degeneration.  Patent canal and foramina.  Disc bulge extends into the foramina and touches the inferior aspect of  exiting bilateral but left greater than right L5 nerve roots without  impingement or compression of the nerve roots or foraminal disease.    S1 -- 2: Rudimentary disc present  "at S1-2.  Patent canal and foramina.    Impression  IMPRESSION: Stable MRI lumbar spine with minimal spondylosis without  canal or foraminal disease.    COUNTING REFERENCE: Inferior lumbar disc is taken as L5-S1.  Structural  anomalies: None.      : JT  Transcribe Date/Time: Aug 30 2023  8:36A    Dictated by : ROCHELLE LOWERY MD    This examination was interpreted and the report reviewed and  electronically signed by:  ROCHELLE LOWERY MD on Aug 30 2023  8:39AM  EST      Assessment/Plan    Migraine with aura, episodic, not intractable  No prior brain imaging  Wake-up HA maybe associated with uncontrolled KWASI, or something else  Discussed and reviewed headache/migraine pathophysiology with patient.    Discussed non-drug therapy including stress reduction, identification and avoidance as much as possible of precipitating factors. Avoid smoking/alcohol intake. Try to get good night sleep.   No headache abortive medication regimen We discussed about trial of sumatriptan prn, correct way of administration and poss SE discussed--pt wants to try  We discussed about considering daily preventive medical therapy, michael due to comorbid leg symptoms.  We discussed about amitriptyline. Possible side effects were discussed as well. He would like to try.    Leg spasms  EMG/NCT BLE per pt report was \"normal\" at [facility]--I dont have report  MRI l-spine wo done relatively unremarkable per report except for mild degen changes  At this time, I do not know that I have an etiology for him  Will try symptomatic treatment with muscle relaxer--discussed poss SE, pt wants to try    KWASI on CPAP  Being seen elsewhere    Plans:  Need EMG/NCT of BLE report--pt to supply  Try amitriptyline 25mg at bedtime--erx'd  Use your CPAP  Try sumatriptan 100mg prn as needed--erx'd  Try cyclobenzaprine 10mg q8h prn--erx'd    Try to take cyclobenzaprine first for a few days, then can start the amitriptyline and sumatriptan    Rtc 2 mo    All " questions were answered.  Pt knows how to contact my office in case pt has any questions or concerns.    Eliseo Jacobo MD              [1]  Patient Active Problem List  Diagnosis   • Complex regional pain syndrome type 1 of right lower extremity   • Achilles tendinosis of right lower extremity   • Knee pain, bilateral   • Obesity   • Pes cavus   • Recurrent acute suppurative otitis media of right ear without spontaneous rupture of tympanic membrane   • Lethargy   • Mild recurrent major depression   • Insomnia   • Testicular hypofunction   • Attention deficit hyperactivity disorder, predominantly inattentive type   • Metabolic syndrome X   • Chronic pain syndrome   • Bee allergy status   • Achilles tendinitis of both lower extremities   • Allergic conjunctivitis   • Allergic rhinitis   • Ankylosis of sacroiliac joint   • Atypical face pain   • Chest pain, musculoskeletal   • Chronic mastoiditis   • Chronic pansinusitis   • Chronic tension headache   • Controlled type 2 diabetes mellitus without complication, without long-term current use of insulin   • Dyspnea on minimal exertion   • TORRIE (generalized anxiety disorder)   • Hearing decreased   • Hyperthermia   • Migraine without aura and without status migrainosus, not intractable   • Mixed conductive and sensorineural hearing loss of right ear with restricted hearing of left ear   • Trigeminal neuralgia of right side of face   • Arthralgia   • Class 3 severe obesity with body mass index (BMI) of 40.0 to 44.9 in adult   • Umbilical hernia without obstruction and without gangrene   • Primary hypertension   [2]  Past Medical History:  Diagnosis Date   • Difficulty walking 11/2022   • DVT (deep venous thrombosis) (Multi) 01/23/2023   • Knee injury 01/23/2023   • Migraine    • Peroneal tendinitis of right lower extremity 01/23/2023   • Shoulder injury 01/23/2023   • Tear of peroneal tendon 01/23/2023   • Trigeminal neuralgia 2017   • Weakness of limb 11/2022   [3]  Past  Surgical History:  Procedure Laterality Date   • OTHER SURGICAL HISTORY  12/03/2021    Ankle surgery   • OTHER SURGICAL HISTORY  12/03/2021    Ear surgery   • OTHER SURGICAL HISTORY  12/03/2021    Umbilical hernia repair   • OTHER SURGICAL HISTORY  12/03/2021    Inguinal hernia repair   [4]  Allergies  Allergen Reactions   • Cephalosporins Unknown   • Keflex [Cephalexin] Unknown   • Metronidazole Unknown     Flagyl   • Sulfamethoxazole-Trimethoprim Unknown     Bactrim   • Adhesive Rash   • Clarithromycin Unknown and Rash     Biaxin   • Clindamycin Diarrhea, Itching and Other   • Doxycycline Rash and Other     Other=Facial swelling   [5]    Current Outpatient Medications:   •  albuterol 90 mcg/actuation inhaler, Inhale., Disp: , Rfl:   •  azelastine (Astelin) 137 mcg (0.1 %) nasal spray, Administer 1 spray into each nostril 2 times a day., Disp: , Rfl:   •  Ciprodex otic suspension, , Disp: , Rfl:   •  desloratadine (Clarinex) 5 mg tablet, Take 1 tablet (5 mg) by mouth once daily., Disp: , Rfl:   •  EPINEPHrine 0.3 mg/0.3 mL injection syringe, ADMINISTER 0.3 MILLILITERS INTRAMUSCULARLY ONE TIME MAY REPEAT ONE TIME FOR ALLERGY, Disp: , Rfl:   •  fluticasone (Flonase) 50 mcg/actuation nasal spray, Administer 1 spray into each nostril once daily at bedtime., Disp: , Rfl:   •  levocetirizine (Xyzal) 5 mg tablet, Take 1 tablet (5 mg) by mouth once daily. Zyzal Allergy 24HR (levocetirizine Dihydrochloride) 5 MG Oral Tablets, Disp: , Rfl:   •  phentermine (Adipex-P) 37.5 mg tablet, Take 1 tablet (37.5 mg) by mouth early in the morning.., Disp: , Rfl:   •  amitriptyline (Elavil) 25 mg tablet, Take 1 tablet (25 mg) by mouth once daily at bedtime., Disp: 30 tablet, Rfl: 1  •  cyclobenzaprine (Flexeril) 5 mg tablet, Take 2 tablets (10 mg) by mouth 3 times a day as needed for muscle spasms for up to 10 days. For leg spasms, Disp: 30 tablet, Rfl: 0  •  SUMAtriptan (Imitrex) 100 mg tablet, Take 1 tablet (100 mg) by mouth 1 time if  needed for migraine (may repeat x1). Can take another 1 tab 2 hours later if still with migraine. Max of 2 tabs in 24 hours., Disp: 9 tablet, Rfl: 1       [1]  Patient Active Problem List  Diagnosis   • Complex regional pain syndrome type 1 of right lower extremity   • Achilles tendinosis of right lower extremity   • Knee pain, bilateral   • Obesity   • Pes cavus   • Recurrent acute suppurative otitis media of right ear without spontaneous rupture of tympanic membrane   • Lethargy   • Mild recurrent major depression   • Insomnia   • Testicular hypofunction   • Attention deficit hyperactivity disorder, predominantly inattentive type   • Metabolic syndrome X   • Chronic pain syndrome   • Bee allergy status   • Achilles tendinitis of both lower extremities   • Allergic conjunctivitis   • Allergic rhinitis   • Ankylosis of sacroiliac joint   • Atypical face pain   • Chest pain, musculoskeletal   • Chronic mastoiditis   • Chronic pansinusitis   • Chronic tension headache   • Controlled type 2 diabetes mellitus without complication, without long-term current use of insulin   • Dyspnea on minimal exertion   • TORRIE (generalized anxiety disorder)   • Hearing decreased   • Hyperthermia   • Migraine without aura and without status migrainosus, not intractable   • Mixed conductive and sensorineural hearing loss of right ear with restricted hearing of left ear   • Trigeminal neuralgia of right side of face   • Arthralgia   • Class 3 severe obesity with body mass index (BMI) of 40.0 to 44.9 in adult   • Umbilical hernia without obstruction and without gangrene   • Primary hypertension   [2]  Past Medical History:  Diagnosis Date   • Difficulty walking 11/2022   • DVT (deep venous thrombosis) (Multi) 01/23/2023   • Knee injury 01/23/2023   • Migraine    • Peroneal tendinitis of right lower extremity 01/23/2023   • Shoulder injury 01/23/2023   • Tear of peroneal tendon 01/23/2023   • Trigeminal neuralgia 2017   • Weakness of limb  11/2022   [3]  Past Surgical History:  Procedure Laterality Date   • OTHER SURGICAL HISTORY  12/03/2021    Ankle surgery   • OTHER SURGICAL HISTORY  12/03/2021    Ear surgery   • OTHER SURGICAL HISTORY  12/03/2021    Umbilical hernia repair   • OTHER SURGICAL HISTORY  12/03/2021    Inguinal hernia repair   [4]  Allergies  Allergen Reactions   • Cephalosporins Unknown   • Keflex [Cephalexin] Unknown   • Metronidazole Unknown     Flagyl   • Sulfamethoxazole-Trimethoprim Unknown     Bactrim   • Adhesive Rash   • Clarithromycin Unknown and Rash     Biaxin   • Clindamycin Diarrhea, Itching and Other   • Doxycycline Rash and Other     Other=Facial swelling   [5]    Current Outpatient Medications:   •  albuterol 90 mcg/actuation inhaler, Inhale., Disp: , Rfl:   •  azelastine (Astelin) 137 mcg (0.1 %) nasal spray, Administer 1 spray into each nostril 2 times a day., Disp: , Rfl:   •  Ciprodex otic suspension, , Disp: , Rfl:   •  desloratadine (Clarinex) 5 mg tablet, Take 1 tablet (5 mg) by mouth once daily., Disp: , Rfl:   •  EPINEPHrine 0.3 mg/0.3 mL injection syringe, ADMINISTER 0.3 MILLILITERS INTRAMUSCULARLY ONE TIME MAY REPEAT ONE TIME FOR ALLERGY, Disp: , Rfl:   •  fluticasone (Flonase) 50 mcg/actuation nasal spray, Administer 1 spray into each nostril once daily at bedtime., Disp: , Rfl:   •  levocetirizine (Xyzal) 5 mg tablet, Take 1 tablet (5 mg) by mouth once daily. Zyzal Allergy 24HR (levocetirizine Dihydrochloride) 5 MG Oral Tablets, Disp: , Rfl:   •  phentermine (Adipex-P) 37.5 mg tablet, Take 1 tablet (37.5 mg) by mouth early in the morning.., Disp: , Rfl:   •  amitriptyline (Elavil) 25 mg tablet, Take 1 tablet (25 mg) by mouth once daily at bedtime., Disp: 30 tablet, Rfl: 1  •  cyclobenzaprine (Flexeril) 5 mg tablet, Take 2 tablets (10 mg) by mouth 3 times a day as needed for muscle spasms for up to 10 days. For leg spasms, Disp: 30 tablet, Rfl: 0  •  SUMAtriptan (Imitrex) 100 mg tablet, Take 1 tablet (100  mg) by mouth 1 time if needed for migraine (may repeat x1). Can take another 1 tab 2 hours later if still with migraine. Max of 2 tabs in 24 hours., Disp: 9 tablet, Rfl: 1

## 2025-06-13 NOTE — PATIENT INSTRUCTIONS
Need EMG/NCT of BLE report--pt to supply  Try amitriptyline 25mg at bedtime--erx'd  Use your CPAP  Try sumatriptan 100mg prn as needed--erx'd  Try cyclobenzaprine 10mg q8h prn--erx'd    Try to take cyclobenzaprine first for a few days, then can start the amitriptyline and sumatriptan    Rtc 2 mo

## 2025-06-13 NOTE — PROGRESS NOTES
"Telehealth visit    Visit type: new patient visit    PCP: Rajendra Wilkerson MD.    Subjective     Broderick Whitten is a 44 y.o. year old right handed male who presents with Migraine, Loss of Consciousness, and leg spasms.    Patient is accompanied by none.     Telehealth visit today. The patient was informed about the telehealth clinical encounter including benefits to avoiding travel, limitations of the assessment, and billing for the service. In-office care may be recommended if needed. Telehealth sessions are not being recorded and personal health information is protected. All questions were answered and verbal consent from the patient (or guardian) was obtained to proceed. Patient verbally confirmed, patient physically in [place].     HPI    Here with multiple issues.    Hx anxiety, asthma, diabetes, obesity    Says has spasms in legs. Gets so bad. Lasts few hours, has passed out in past a few times from pain due to spasms.     Dx KWASI 2-3 weeks ago, just started on CPAP machine.    Legs: Dealing with them for about 2 years now. Had surgery in L ankle 2017, later on R ankle. Peroneal tendon. Started Nov 2022, having severe spasms, L leg. Starts at top of foot, leg, never on same spot. Can happen anytime, any position. No trigger. When sleeping, either top of foot or back of calf. Shins and sides of legs. No problem in bottom of feet. Has lost weight. Has more pain after he goes to gym. Has had blood work done. MRI l-spine wo done. No problem when on vacation in [place]. Knees hurt as well. Has been evaluated by orthopedics, was told nothing wrong in knee. Has low back pain. Denies sciatica. Feels muscle cramping. Legs feel tight. No muscle rippling. Leg has bruised from spasm. No neurology appointment. [facility] did EMG/NCT, but was told \"nothing was normal\". I do not have records. Tried medical marijuana. Eating potassium, banana. Tonic water, coconut water. Bed raised. Nothing helping. Questioning if somehow this " "is related to barometric pressure. When on vacation locally in [place], has some as well.    No balance issues.    Has tried to jump out of bed due to spasm in leg. Fell hit side, \"busted head\" on side. ? Orthostatic symptom. 3x in the last year. Trying to stretch out, states \"blacked out\".    Tried PT for knees and legs. Still does exercises. Was on flexeril a long time ago 16 years ago--didn't have pain then. Has not tried muscle relaxers recently.    7/2023   10/2024 Mg normal    Headaches: All day, most of the time. x7 months. Has good days with no HA. ? Related to KWASI. Bifrontal, really bad. Sometimes goes all the way around. If with severe HA vision goes blurry. Ibuprofen/tylenol doesn't help. Throbs. (+) light sensitivity. Sees shooting stars at times. (+) nausea. (-) vomiting. Sometimes 2-3 days in a row, then good for a week. Hasn't had one in 4 days now. Has HA about 12 days a month. Laying down resting helps. No triggers. Worse in the morning. Has woken up from sleep due to HA.    Has had routine eye checks, wears contact/glasses, states no problem in eye contributing to HA.    No prior brain imaging.    No MVA. No kidney stones. (+) asthma. No glaucoma. No known heart issues.    Works as  for insurance company, mostly desk job.     Former smoker. Occasional alcohol. Has medical marijuana card. No street drug use.      Review of Systems   Constitutional:  Negative for appetite change, chills and fever.   HENT:  Negative for ear pain and nosebleeds.    Eyes:  Negative for discharge and itching.   Respiratory:  Negative for choking and chest tightness.    Cardiovascular:  Negative for chest pain and palpitations.   Gastrointestinal:  Negative for abdominal distention, abdominal pain and nausea.   Endocrine: Negative for cold intolerance and heat intolerance.   Genitourinary:  Negative for difficulty urinating and dysuria.   Musculoskeletal:  Negative for gait problem and myalgias. "   Neurological:  Negative for dizziness, seizures and numbness.     Problem List[1]    Medical History[2]  Surgical History[3]  Social History     Tobacco Use    Smoking status: Former     Current packs/day: 0.50     Average packs/day: 0.5 packs/day for 5.0 years (2.5 ttl pk-yrs)     Types: Cigars, Cigarettes    Smokeless tobacco: Former     Types: Chew     Quit date: 1/1/2002   Substance Use Topics    Alcohol use: Not Currently     family history includes No Known Problems in his father and mother.    Allergies[4]  Current Medications[5]    Objective     Vital Signs:  None--telehealth visit    Awake, alert, oriented x3, in no distress  Well-nourished, seated    Mental status exam as above, conversant   Fair fund of knowledge  Recent/remote memory fair  Fair attention span  Full EOMs intact, no nystagmus, no ptosis   No facial droop   Hearing grossly intact   No dysarthria    Motor strength is at least antigravity on all extremities   Finger to nose test intact bilaterally   Negative Romberg sign   I did not have him walk      Lab Results   Component Value Date    WBC 8.5 01/04/2022    RBC 5.71 01/04/2022    HGB 16.5 01/04/2022    HCT 51.5 01/04/2022     01/04/2022     04/14/2023    K 4.3 04/14/2023     04/14/2023    BUN 23 04/14/2023    CREATININE 0.77 04/14/2023    CALCIUM 8.9 04/14/2023    ALKPHOS 68 02/22/2022    AST 28 02/22/2022    ALT 49 02/22/2022    VITD25 25 (A) 04/14/2023    HGBA1C 5.7 (H) 06/09/2025    CHOL 147 07/07/2021    HDL 54.6 07/07/2021    TRIG 74 07/07/2021    TSH 1.49 04/14/2023        MR lumbar spine wo IV contrast 08/29/2023    Narrative  * * *Final Report* * *    DATE OF EXAM: Aug 29 2023  6:24PM    BC[facility]   0303  -  MRI LUMBAR SPINE WO IVCON  / ACCESSION #  [id number]    PROCEDURE REASON: Spinal stenosis of lumbar region with neurogenic  claudication    * * * * Physician Interpretation * * * *    COMPARISON: 08/18/2016.    HISTORY:  Lumbar spinal stenosis with neurogenic  claudication.    TECHNIQUE: MRI of lumbar spine without contrast.  MQ: [id number]    RESULT: MRI OF LUMBAR SPINE: Acute abnormality: None.    Stable but mild exaggeration of normal lumbar lordosis.  Decreased disc  signal indicating disc desiccation with annular tears at L3-4 and L4-5  and L5-S1 indicating mild disc degeneration but without any canal  compromise.  Mild facet and ligamentum flavum hypertrophy is also  identified.  Mild disc degeneration without canal or thecal sac disease  is identified at T11-12 which is incompletely evaluated on current exam.  Stable alignment, vertebral height, marrow signal, intervertebral disc  height/signal, central canal, thecal sac, spinal cord signal/caliber &  cauda equina.  No fracture/dislocation.  Normal soft tissues.    L1 -- 2: Patent canal and foramina.    L2 -- 3: Patent canal and foramina.    L3 -- 4: Mild disc bulge and joint degeneration.  Patent canal and  foramina.    L4 -- 5: Mild disc bulge and joint degeneration.  Patent canal and  foramina.    L5 -- S1: Disc bulge and joint degeneration.  Patent canal and foramina.  Disc bulge extends into the foramina and touches the inferior aspect of  exiting bilateral but left greater than right L5 nerve roots without  impingement or compression of the nerve roots or foraminal disease.    S1 -- 2: Rudimentary disc present at S1-2.  Patent canal and foramina.    Impression  IMPRESSION: Stable MRI lumbar spine with minimal spondylosis without  canal or foraminal disease.    COUNTING REFERENCE: Inferior lumbar disc is taken as L5-S1.  Structural  anomalies: None.      : JT  Transcribe Date/Time: Aug 30 2023  8:36A    Dictated by : ROCHELLE LOWERY MD    This examination was interpreted and the report reviewed and  electronically signed by:  ROCHELLE LOWERY MD on Aug 30 2023  8:39AM  EST      Assessment/Plan     Migraine with aura, episodic, not intractable  No prior brain imaging  Wake-up HA maybe associated  "with uncontrolled KWASI, or something else  Discussed and reviewed headache/migraine pathophysiology with patient.    Discussed non-drug therapy including stress reduction, identification and avoidance as much as possible of precipitating factors. Avoid smoking/alcohol intake. Try to get good night sleep.   No headache abortive medication regimen We discussed about trial of sumatriptan prn, correct way of administration and poss SE discussed--pt wants to try  We discussed about considering daily preventive medical therapy, michael due to comorbid leg symptoms.  We discussed about amitriptyline. Possible side effects were discussed as well. He would like to try.    Leg spasms  EMG/NCT BLE per pt report was \"normal\" at [facility]--I dont have report  MRI l-spine wo done relatively unremarkable per report except for mild degen changes  At this time, I do not know that I have an etiology for him  Will try symptomatic treatment with muscle relaxer--discussed poss SE, pt wants to try    KWASI on CPAP  Being seen elsewhere    Plans:  Need EMG/NCT of BLE report--pt to supply  Try amitriptyline 25mg at bedtime--erx'd  Use your CPAP  Try sumatriptan 100mg prn as needed--erx'd  Try cyclobenzaprine 10mg q8h prn--erx'd    Try to take cyclobenzaprine first for a few days, then can start the amitriptyline and sumatriptan    Rtc 2 mo    All questions were answered.  Pt knows how to contact my office in case pt has any questions or concerns.    Eliseo Jacobo MD              [1]   Patient Active Problem List  Diagnosis    Complex regional pain syndrome type 1 of right lower extremity    Achilles tendinosis of right lower extremity    Knee pain, bilateral    Obesity    Pes cavus    Recurrent acute suppurative otitis media of right ear without spontaneous rupture of tympanic membrane    Lethargy    Mild recurrent major depression    Insomnia    Testicular hypofunction    Attention deficit hyperactivity disorder, predominantly inattentive type    " Metabolic syndrome X    Chronic pain syndrome    Bee allergy status    Achilles tendinitis of both lower extremities    Allergic conjunctivitis    Allergic rhinitis    Ankylosis of sacroiliac joint    Atypical face pain    Chest pain, musculoskeletal    Chronic mastoiditis    Chronic pansinusitis    Chronic tension headache    Controlled type 2 diabetes mellitus without complication, without long-term current use of insulin    Dyspnea on minimal exertion    TORRIE (generalized anxiety disorder)    Hearing decreased    Hyperthermia    Migraine without aura and without status migrainosus, not intractable    Mixed conductive and sensorineural hearing loss of right ear with restricted hearing of left ear    Trigeminal neuralgia of right side of face    Arthralgia    Class 3 severe obesity with body mass index (BMI) of 40.0 to 44.9 in adult    Umbilical hernia without obstruction and without gangrene    Primary hypertension   [2]   Past Medical History:  Diagnosis Date    Difficulty walking 11/2022    DVT (deep venous thrombosis) (Multi) 01/23/2023    Knee injury 01/23/2023    Migraine     Peroneal tendinitis of right lower extremity 01/23/2023    Shoulder injury 01/23/2023    Tear of peroneal tendon 01/23/2023    Trigeminal neuralgia 2017    Weakness of limb 11/2022   [3]   Past Surgical History:  Procedure Laterality Date    OTHER SURGICAL HISTORY  12/03/2021    Ankle surgery    OTHER SURGICAL HISTORY  12/03/2021    Ear surgery    OTHER SURGICAL HISTORY  12/03/2021    Umbilical hernia repair    OTHER SURGICAL HISTORY  12/03/2021    Inguinal hernia repair   [4]   Allergies  Allergen Reactions    Cephalosporins Unknown    Keflex [Cephalexin] Unknown    Metronidazole Unknown     Flagyl    Sulfamethoxazole-Trimethoprim Unknown     Bactrim    Adhesive Rash    Clarithromycin Unknown and Rash     Biaxin    Clindamycin Diarrhea, Itching and Other    Doxycycline Rash and Other     Other=Facial swelling   [5]   Current Outpatient  Medications:     albuterol 90 mcg/actuation inhaler, Inhale., Disp: , Rfl:     azelastine (Astelin) 137 mcg (0.1 %) nasal spray, Administer 1 spray into each nostril 2 times a day., Disp: , Rfl:     Ciprodex otic suspension, , Disp: , Rfl:     desloratadine (Clarinex) 5 mg tablet, Take 1 tablet (5 mg) by mouth once daily., Disp: , Rfl:     EPINEPHrine 0.3 mg/0.3 mL injection syringe, ADMINISTER 0.3 MILLILITERS INTRAMUSCULARLY ONE TIME MAY REPEAT ONE TIME FOR ALLERGY, Disp: , Rfl:     fluticasone (Flonase) 50 mcg/actuation nasal spray, Administer 1 spray into each nostril once daily at bedtime., Disp: , Rfl:     levocetirizine (Xyzal) 5 mg tablet, Take 1 tablet (5 mg) by mouth once daily. Zyzal Allergy 24HR (levocetirizine Dihydrochloride) 5 MG Oral Tablets, Disp: , Rfl:     phentermine (Adipex-P) 37.5 mg tablet, Take 1 tablet (37.5 mg) by mouth early in the morning.., Disp: , Rfl:     amitriptyline (Elavil) 25 mg tablet, Take 1 tablet (25 mg) by mouth once daily at bedtime., Disp: 30 tablet, Rfl: 1    cyclobenzaprine (Flexeril) 5 mg tablet, Take 2 tablets (10 mg) by mouth 3 times a day as needed for muscle spasms for up to 10 days. For leg spasms, Disp: 30 tablet, Rfl: 0    SUMAtriptan (Imitrex) 100 mg tablet, Take 1 tablet (100 mg) by mouth 1 time if needed for migraine (may repeat x1). Can take another 1 tab 2 hours later if still with migraine. Max of 2 tabs in 24 hours., Disp: 9 tablet, Rfl: 1

## 2025-06-15 PROBLEM — M62.838 MUSCLE SPASMS OF BOTH LOWER EXTREMITIES: Status: ACTIVE | Noted: 2025-06-15

## 2025-06-15 PROBLEM — G47.33 OSA ON CPAP: Status: ACTIVE | Noted: 2025-06-15

## 2025-06-15 PROBLEM — G43.109 MIGRAINE WITH AURA AND WITHOUT STATUS MIGRAINOSUS, NOT INTRACTABLE: Status: ACTIVE | Noted: 2025-06-15

## 2025-08-08 ENCOUNTER — APPOINTMENT (OUTPATIENT)
Dept: ENDOCRINOLOGY | Facility: CLINIC | Age: 45
End: 2025-08-08
Payer: COMMERCIAL

## 2025-09-18 ENCOUNTER — APPOINTMENT (OUTPATIENT)
Dept: RHEUMATOLOGY | Facility: CLINIC | Age: 45
End: 2025-09-18
Payer: COMMERCIAL

## 2025-09-22 ENCOUNTER — APPOINTMENT (OUTPATIENT)
Dept: ENDOCRINOLOGY | Facility: CLINIC | Age: 45
End: 2025-09-22
Payer: COMMERCIAL

## 2025-12-01 ENCOUNTER — APPOINTMENT (OUTPATIENT)
Dept: NEUROLOGY | Facility: HOSPITAL | Age: 45
End: 2025-12-01
Payer: COMMERCIAL